# Patient Record
Sex: FEMALE | Race: BLACK OR AFRICAN AMERICAN
[De-identification: names, ages, dates, MRNs, and addresses within clinical notes are randomized per-mention and may not be internally consistent; named-entity substitution may affect disease eponyms.]

---

## 2024-05-10 ENCOUNTER — APPOINTMENT (OUTPATIENT)
Dept: OBGYN | Facility: CLINIC | Age: 64
End: 2024-05-10
Payer: MEDICAID

## 2024-05-10 ENCOUNTER — NON-APPOINTMENT (OUTPATIENT)
Age: 64
End: 2024-05-10

## 2024-05-10 VITALS — BODY MASS INDEX: 41.02 KG/M2 | DIASTOLIC BLOOD PRESSURE: 82 MMHG | SYSTOLIC BLOOD PRESSURE: 134 MMHG | WEIGHT: 239 LBS

## 2024-05-10 DIAGNOSIS — T83.39XA OTHER MECHANICAL COMPLICATION OF INTRAUTERINE CONTRACEPTIVE DEVICE, INITIAL ENCOUNTER: ICD-10-CM

## 2024-05-10 PROCEDURE — 99215 OFFICE O/P EST HI 40 MIN: CPT

## 2024-06-03 NOTE — HISTORY OF PRESENT ILLNESS
[Patient reported mammogram was normal] : Patient reported mammogram was normal [postmenopausal] : postmenopausal [N] : Patient is not sexually active [Y] : Positive pregnancy history [Post-Menopause, No Sxs] : post-menopausal, currently without symptoms [Previously active] : previously active [FreeTextEntry1] : Gayatri Peterson is a 63yo female with multiple medical comorbidities, poor historian and poor memory, presenting today from her nursing home (in a wheelchair but able to ambulate to the exam table). She presents to the office for possible surgical booking to remove an IUD under anesthesia. Per pt, she has "many infections" and she has tried to have it removed at 3 different times in her nursing home ([facility]) to no avail as she has poor pain tolerance in the office setting. She has had the IUD in place since  and she reports it has been "bothering" her for 20yrs now but at other times, she reports it has been more recently in the last few years - again, unclear history and poor historian. She also reports painful, bleeding hemorrhoids and constipation as well as frequent urination/urinary incontinence and wears pull-ups daily.  OBHx:  x 2, VTOP x 1 GynHx: denies known hx abnl paps but can't recall her last one, denies fibroids, cysts, STIs MedHx: anemia, vitD def, morbid obesity, HL, opioid dependence, schizophrenia, major depressive disorder, cataracts b/l, chronic pain, heart failure vs PVD, COPD (on home O2), GERD, CAD, constipation, nicotine dependence (jaylne, quit 5yrs ago), Sjogren syndrome, low back pain, preDM, hyperCa2+, glaucoma SurgHx: open appy for ruptured appendix Meds: seroquel XR 350mg qhs, bumetanide 2mg daily, tizanidine 6mg TID, acetaminophen 325mg 2 tablets daily, aspirin 81mg daily, bisacodyl 5mg 2 tablets daily, docusate sodium 100mg 3 caps daily, lisinopril 20mg daily, melatonin 5mg daily, rosuvastatin 10mg daily, senna 8.6mg 2 tablets daily, simethicone 125mg chewable tablets TID, METHADONE Intensol 9mL (90mg) daily, latanoprost eye drops, citalopram 20mg daily, miralax 17g daily All: PCN FULL CODE   [Mammogramdate] : 11/2023 [TextBox_19] : Patient reports normal [PapSmeardate] : ?? [PGHxTotal] : 3 [PGHxAbortions] : 1 [[facility] : 2

## 2024-06-03 NOTE — PLAN
[FreeTextEntry1] : Gayatri is a 63yo with multiple medical conditions, poly-pharmacy, hx drug abuse on methadone and poor historian on home O2 convinced her IUD from >30yrs ago is the cause of her vaginal discharge/infections and discomfort. She presented from her nursing home with paperwork containing her medical hx and medications, but no pelvic imaging. She cannot recall when she had a pap smear last either and was not comfortable today to have a pelvic exam. - Called her nursing home PA who is responsible for her daily care (Mihai Pena: [phone]) and she explained the patient has been very much fixated on the IUD as the root of her pelvic complaints but is also c/o urinary and defecation/hemorrhoid issues. Asked Mihai to assist patient in obtaining pelvic ultrasound and ensure IUD is indeed in uterus and ideally have patient return for pelvic exam and pap smear prior to further surgical booking. Additionally, pt is amenable to a try in the office IF she can be pre-medicated with anxiolytic and pain medication/opiate - asked Mihai if this is do-able given her hx and was told it IS okay to do. Discussed in detail with Mihai that Ms. Peterson is a poor surgical candidate even if to be put under GETA for a limited amount of time given her multiple medical comorbidities, O2 requirement and opiate-dependence. If IUD has been in situ for > 30yrs, unlikely to be causing any grave issues at this time and would require conversation with her HCP to determine if work the risk of anesthesia versus leaving alone.  To f/u after TVS/TAUS completed

## 2024-06-03 NOTE — END OF VISIT
[Time Spent: ___ minutes] : I have spent [unfilled] minutes of time on the encounter. [FreeTextEntry3] : This visit took 40 minutes in person + 15 minutes of coordinating/corresponding with the pt's caretaker at her nursing home + 15mins of documentation.

## 2024-06-03 NOTE — PHYSICAL EXAM
[Alert] : alert [No Acute Distress] : no acute distress [Chaperone Present] : A chaperone was present in the examining room during all aspects of the physical examination [FreeTextEntry2] : confusing story, poor historian [FreeTextEntry1] : declined pelvic exam/TVS today

## 2024-10-29 ENCOUNTER — APPOINTMENT (OUTPATIENT)
Dept: OBGYN | Facility: CLINIC | Age: 64
End: 2024-10-29